# Patient Record
Sex: FEMALE | Race: WHITE | NOT HISPANIC OR LATINO
[De-identification: names, ages, dates, MRNs, and addresses within clinical notes are randomized per-mention and may not be internally consistent; named-entity substitution may affect disease eponyms.]

---

## 2018-03-21 PROBLEM — Z00.00 ENCOUNTER FOR PREVENTIVE HEALTH EXAMINATION: Status: ACTIVE | Noted: 2018-03-21

## 2018-04-20 ENCOUNTER — APPOINTMENT (OUTPATIENT)
Dept: ENDOCRINOLOGY | Facility: CLINIC | Age: 28
End: 2018-04-20
Payer: COMMERCIAL

## 2018-04-20 ENCOUNTER — RX RENEWAL (OUTPATIENT)
Age: 28
End: 2018-04-20

## 2018-04-20 VITALS
OXYGEN SATURATION: 98 % | WEIGHT: 118 LBS | HEIGHT: 59 IN | HEART RATE: 111 BPM | DIASTOLIC BLOOD PRESSURE: 70 MMHG | SYSTOLIC BLOOD PRESSURE: 110 MMHG | BODY MASS INDEX: 23.79 KG/M2

## 2018-04-20 DIAGNOSIS — Z83.3 FAMILY HISTORY OF DIABETES MELLITUS: ICD-10-CM

## 2018-04-20 DIAGNOSIS — Z82.49 FAMILY HISTORY OF ISCHEMIC HEART DISEASE AND OTHER DISEASES OF THE CIRCULATORY SYSTEM: ICD-10-CM

## 2018-04-20 DIAGNOSIS — E10.9 TYPE 1 DIABETES MELLITUS W/OUT COMPLICATIONS: ICD-10-CM

## 2018-04-20 LAB — GLUCOSE BLDC GLUCOMTR-MCNC: 372

## 2018-04-20 PROCEDURE — 36415 COLL VENOUS BLD VENIPUNCTURE: CPT

## 2018-04-20 PROCEDURE — 99204 OFFICE O/P NEW MOD 45 MIN: CPT | Mod: 25

## 2018-04-20 PROCEDURE — 82962 GLUCOSE BLOOD TEST: CPT

## 2018-04-20 RX ORDER — BLOOD-GLUCOSE METER
KIT MISCELLANEOUS
Qty: 1 | Refills: 0 | Status: ACTIVE | COMMUNITY
Start: 2018-04-20 | End: 1900-01-01

## 2018-04-20 RX ORDER — BLOOD SUGAR DIAGNOSTIC
STRIP MISCELLANEOUS
Qty: 2 | Refills: 5 | Status: ACTIVE | COMMUNITY
Start: 2018-04-20 | End: 1900-01-01

## 2018-04-20 RX ORDER — LANCETS 28 GAUGE
EACH MISCELLANEOUS
Qty: 2 | Refills: 5 | Status: ACTIVE | COMMUNITY
Start: 2018-04-20 | End: 1900-01-01

## 2018-04-22 PROBLEM — Z83.3 FAMILY HISTORY OF TYPE 1 DIABETES MELLITUS: Status: ACTIVE | Noted: 2018-04-22

## 2018-04-22 PROBLEM — Z83.3 FAMILY HISTORY OF DIABETES MELLITUS: Status: ACTIVE | Noted: 2018-04-22

## 2018-04-22 PROBLEM — Z82.49 FAMILY HISTORY OF HEART FAILURE: Status: ACTIVE | Noted: 2018-04-22

## 2018-04-22 PROBLEM — Z82.49 FAMILY HISTORY OF CONGESTIVE HEART FAILURE: Status: ACTIVE | Noted: 2018-04-22

## 2018-04-22 RX ORDER — BLOOD-GLUCOSE METER
W/DEVICE EACH MISCELLANEOUS
Qty: 1 | Refills: 0 | Status: DISCONTINUED | COMMUNITY
Start: 2018-04-20 | End: 2018-04-22

## 2018-04-22 RX ORDER — INSULIN GLARGINE 100 [IU]/ML
100 INJECTION, SOLUTION SUBCUTANEOUS
Qty: 1 | Refills: 0 | Status: DISCONTINUED | COMMUNITY
Start: 2018-04-20 | End: 2018-04-22

## 2018-05-03 LAB
25(OH)D3 SERPL-MCNC: 18.4 NG/ML
ALBUMIN SERPL ELPH-MCNC: 4.3 G/DL
ALP BLD-CCNC: 114 U/L
ALT SERPL-CCNC: 23 U/L
ANION GAP SERPL CALC-SCNC: NORMAL
AST SERPL-CCNC: 26 U/L
BASOPHILS # BLD AUTO: 0.03 K/UL
BASOPHILS NFR BLD AUTO: 0.3 %
BILIRUB SERPL-MCNC: <0.2 MG/DL
BUN SERPL-MCNC: 15 MG/DL
C PEPTIDE SERPL-MCNC: <0.1 NG/ML
CALCIUM SERPL-MCNC: 10.5 MG/DL
CHLORIDE SERPL-SCNC: 102 MMOL/L
CHOLEST SERPL-MCNC: 321 MG/DL
CHOLEST/HDLC SERPL: 4.9 RATIO
CO2 SERPL-SCNC: NORMAL
CREAT SERPL-MCNC: 0.78 MG/DL
CREAT SPEC-SCNC: 35 MG/DL
EOSINOPHIL # BLD AUTO: 0.09 K/UL
EOSINOPHIL NFR BLD AUTO: 0.9 %
GAD65 AB SER-MCNC: 0.14 NMOL/L
GLUCOSE SERPL-MCNC: 275 MG/DL
HBA1C MFR BLD HPLC: 9.1 %
HCT VFR BLD CALC: 42.8 %
HDLC SERPL-MCNC: 66 MG/DL
HGB BLD-MCNC: 13.8 G/DL
IGA SER QL IEP: 307 MG/DL
IMM GRANULOCYTES NFR BLD AUTO: 0.3 %
LDLC SERPL CALC-MCNC: 211 MG/DL
LYMPHOCYTES # BLD AUTO: 2.91 K/UL
LYMPHOCYTES NFR BLD AUTO: 30.7 %
MAN DIFF?: NORMAL
MCHC RBC-ENTMCNC: 31.5 PG
MCHC RBC-ENTMCNC: 32.2 GM/DL
MCV RBC AUTO: 97.7 FL
MICROALBUMIN 24H UR DL<=1MG/L-MCNC: <0.3 MG/DL
MICROALBUMIN/CREAT 24H UR-RTO: NORMAL
MONOCYTES # BLD AUTO: 0.45 K/UL
MONOCYTES NFR BLD AUTO: 4.7 %
NEUTROPHILS # BLD AUTO: 5.97 K/UL
NEUTROPHILS NFR BLD AUTO: 63.1 %
PANC ISLET CELL AB SER QL: <5 JDF UNITS
PLATELET # BLD AUTO: 418 K/UL
POTASSIUM SERPL-SCNC: 4.4 MMOL/L
PROT SERPL-MCNC: 7.6 G/DL
RBC # BLD: 4.38 M/UL
RBC # FLD: 14.1 %
SODIUM SERPL-SCNC: 146 MMOL/L
T4 FREE SERPL-MCNC: 1 NG/DL
TRIGL SERPL-MCNC: 220 MG/DL
TSH SERPL-ACNC: 1.08 UIU/ML
TTG IGA SER IA-ACNC: 6.7 UNITS
TTG IGA SER-ACNC: NEGATIVE
TTG IGG SER IA-ACNC: 6.4 UNITS
TTG IGG SER IA-ACNC: NEGATIVE
WBC # FLD AUTO: 9.48 K/UL

## 2018-11-03 ENCOUNTER — TRANSCRIPTION ENCOUNTER (OUTPATIENT)
Age: 28
End: 2018-11-03

## 2019-02-25 ENCOUNTER — RX RENEWAL (OUTPATIENT)
Age: 29
End: 2019-02-25

## 2019-03-27 ENCOUNTER — TRANSCRIPTION ENCOUNTER (OUTPATIENT)
Age: 29
End: 2019-03-27

## 2019-04-01 ENCOUNTER — RX RENEWAL (OUTPATIENT)
Age: 29
End: 2019-04-01

## 2019-04-03 ENCOUNTER — MEDICATION RENEWAL (OUTPATIENT)
Age: 29
End: 2019-04-03

## 2019-04-22 ENCOUNTER — APPOINTMENT (OUTPATIENT)
Dept: ENDOCRINOLOGY | Facility: CLINIC | Age: 29
End: 2019-04-22
Payer: COMMERCIAL

## 2019-04-22 VITALS
OXYGEN SATURATION: 98 % | HEIGHT: 59 IN | BODY MASS INDEX: 28.02 KG/M2 | HEART RATE: 95 BPM | SYSTOLIC BLOOD PRESSURE: 110 MMHG | DIASTOLIC BLOOD PRESSURE: 70 MMHG | WEIGHT: 139 LBS

## 2019-04-22 DIAGNOSIS — Z87.891 PERSONAL HISTORY OF NICOTINE DEPENDENCE: ICD-10-CM

## 2019-04-22 LAB
GLUCOSE BLDC GLUCOMTR-MCNC: 85
HBA1C MFR BLD HPLC: 8.7

## 2019-04-22 PROCEDURE — 99214 OFFICE O/P EST MOD 30 MIN: CPT | Mod: 25

## 2019-04-22 PROCEDURE — 83036 HEMOGLOBIN GLYCOSYLATED A1C: CPT | Mod: QW

## 2019-04-22 PROCEDURE — 82962 GLUCOSE BLOOD TEST: CPT

## 2019-04-22 RX ORDER — BLOOD-GLUCOSE METER
W/DEVICE EACH MISCELLANEOUS
Qty: 1 | Refills: 0 | Status: ACTIVE | COMMUNITY
Start: 2019-04-22 | End: 1900-01-01

## 2019-04-22 RX ORDER — LANCETS
EACH MISCELLANEOUS
Qty: 6 | Refills: 0 | Status: ACTIVE | COMMUNITY
Start: 2019-04-22 | End: 1900-01-01

## 2019-04-22 RX ORDER — FLASH GLUCOSE SENSOR
KIT MISCELLANEOUS
Qty: 1 | Refills: 0 | Status: ACTIVE | COMMUNITY
Start: 2019-04-22 | End: 1900-01-01

## 2019-04-22 RX ORDER — BLOOD SUGAR DIAGNOSTIC
STRIP MISCELLANEOUS 4 TIMES DAILY
Qty: 6 | Refills: 0 | Status: ACTIVE | COMMUNITY
Start: 2019-04-22 | End: 1900-01-01

## 2019-04-29 PROBLEM — Z87.891 FORMER SMOKER: Status: ACTIVE | Noted: 2019-04-29

## 2019-04-29 RX ORDER — PHENYLEPHRINE HCL 10 MG
7 TABLET ORAL
Qty: 14 | Refills: 0 | Status: DISCONTINUED | COMMUNITY
Start: 2018-04-20 | End: 2019-04-29

## 2019-04-29 RX ORDER — NICOTINE 21 MG/24HR
14 PATCH, TRANSDERMAL 24 HOURS TRANSDERMAL
Qty: 42 | Refills: 0 | Status: DISCONTINUED | COMMUNITY
Start: 2018-04-20 | End: 2019-04-29

## 2019-04-29 NOTE — ASSESSMENT
[FreeTextEntry1] : 29 yo F with PMH DM1, \par \par 1. DM1 - HbA1c 9.0  Advised to send in glucose log for further adjustment. Continue current regimen as above for now and adjust as indicated.\par \par .

## 2019-04-29 NOTE — HISTORY OF PRESENT ILLNESS
[FreeTextEntry1] : 27 yo F with PMH DM1,\par \par Quit smoking 9/2018\par Diagnosed with DM1 when she was 22 years old\par She is on levemir 18 u QHS and humalog 4-10  tidac \par for every 15g carbs she will take 2u humalog. \par Microvascular complications: denies neuropathy\par last ophthalmologist visit 3/2018 - denies  retinopathy\par Has never seen a podiatrist\par Last saw a nutritionist the year of diagnosis\par B: oatmeal 18 g takes 4u\par L: cken salad + croutons + piece of bread 30-40g takes 4-6 u\par D: cken noodle soup //turkey chili - no rice 35-45 g  6-10 MC 6-8 u\par noted to correctly count carbs\par F: \par L: \par D: \par snacks on berries and nuts\par

## 2019-06-14 ENCOUNTER — OTHER (OUTPATIENT)
Age: 29
End: 2019-06-14

## 2019-06-17 ENCOUNTER — APPOINTMENT (OUTPATIENT)
Dept: ENDOCRINOLOGY | Facility: CLINIC | Age: 29
End: 2019-06-17
Payer: COMMERCIAL

## 2019-09-25 ENCOUNTER — RX RENEWAL (OUTPATIENT)
Age: 29
End: 2019-09-25

## 2019-09-25 RX ORDER — PEN NEEDLE, DIABETIC 29 G X1/2"
32G X 4 MM NEEDLE, DISPOSABLE MISCELLANEOUS
Qty: 2 | Refills: 4 | Status: ACTIVE | COMMUNITY
Start: 2018-04-20 | End: 1900-01-01

## 2019-10-02 ENCOUNTER — RESULT REVIEW (OUTPATIENT)
Age: 29
End: 2019-10-02

## 2019-10-15 ENCOUNTER — APPOINTMENT (OUTPATIENT)
Dept: ENDOCRINOLOGY | Facility: CLINIC | Age: 29
End: 2019-10-15
Payer: COMMERCIAL

## 2019-10-15 VITALS
WEIGHT: 144 LBS | DIASTOLIC BLOOD PRESSURE: 76 MMHG | HEART RATE: 100 BPM | SYSTOLIC BLOOD PRESSURE: 110 MMHG | OXYGEN SATURATION: 94 % | BODY MASS INDEX: 29.03 KG/M2 | TEMPERATURE: 98.7 F | HEIGHT: 59 IN

## 2019-10-15 DIAGNOSIS — E78.5 HYPERLIPIDEMIA, UNSPECIFIED: ICD-10-CM

## 2019-10-15 PROCEDURE — 36415 COLL VENOUS BLD VENIPUNCTURE: CPT

## 2019-10-15 PROCEDURE — 82962 GLUCOSE BLOOD TEST: CPT

## 2019-10-15 PROCEDURE — 99214 OFFICE O/P EST MOD 30 MIN: CPT | Mod: 25

## 2019-10-15 NOTE — HISTORY OF PRESENT ILLNESS
[FreeTextEntry1] : 29 yo F with PMH DM1\par \par Quit smoking 9/2018\par Diagnosed with DM1 when she was 22 years old\par She is on levemir 18 u QHS and humalog for every 15g carbs she will take 2u humalog. \par Microvascular complications: denies neuropathy\par last ophthalmologist visit 2/2019 - denies  retinopathy\par Has never seen a podiatrist\par Last saw a nutritionist the year of diagnosis\par B: oatmeal and coffee 18-30g\par L: cken and bread //grilled cken wrap + water 30-40g\par D: meatballs and salad (vinegar) 30-50g\par snacks on nuts , popcorn, fruit, apples, berries\par noted to correctly count carbs\par F: \par D: \par walks 45 minutes 2-3 x per week\par hypoglycemic twice in the last month\par reports hair loss since 6/2019\par has always had regular menstruation \par denies acne and  hirsutism

## 2019-10-15 NOTE — ASSESSMENT
[FreeTextEntry1] : 27 yo F with PMH DM1, \par \par 1. DM1 - HbA1c 9.0  Advised to send in glucose log for further adjustment. Continue current regimen as above for now and adjust as indicated.\par \par 2. Hair loss - recent passing of her father. possibly telogen effluvium\par \par .

## 2019-10-23 DIAGNOSIS — E06.3 AUTOIMMUNE THYROIDITIS: ICD-10-CM

## 2019-10-23 LAB
25(OH)D3 SERPL-MCNC: 15 NG/ML
ALBUMIN SERPL ELPH-MCNC: 4.4 G/DL
ALP BLD-CCNC: 82 U/L
ALT SERPL-CCNC: 18 U/L
ANION GAP SERPL CALC-SCNC: 14 MMOL/L
AST SERPL-CCNC: 17 U/L
BASOPHILS # BLD AUTO: 0.05 K/UL
BASOPHILS NFR BLD AUTO: 0.4 %
BILIRUB SERPL-MCNC: 0.2 MG/DL
BUN SERPL-MCNC: 12 MG/DL
CALCIUM SERPL-MCNC: 9.7 MG/DL
CHLORIDE SERPL-SCNC: 103 MMOL/L
CHOLEST SERPL-MCNC: 229 MG/DL
CHOLEST/HDLC SERPL: 5.2 RATIO
CK SERPL-CCNC: 52 U/L
CO2 SERPL-SCNC: 18 MMOL/L
CREAT SERPL-MCNC: 0.48 MG/DL
CREAT SPEC-SCNC: 65 MG/DL
DHEA-S SERPL-MCNC: 801 UG/DL
EOSINOPHIL # BLD AUTO: 0.26 K/UL
EOSINOPHIL NFR BLD AUTO: 2.1 %
ESTIMATED AVERAGE GLUCOSE: 200 MG/DL
ESTRADIOL SERPL-MCNC: 196 PG/ML
FERRITIN SERPL-MCNC: 101 NG/ML
FOLATE SERPL-MCNC: 16.4 NG/ML
FSH SERPL-MCNC: 1.1 IU/L
GLUCOSE BLDC GLUCOMTR-MCNC: 114
GLUCOSE SERPL-MCNC: 123 MG/DL
HBA1C MFR BLD HPLC: 8.6 %
HCT VFR BLD CALC: 44.1 %
HDLC SERPL-MCNC: 44 MG/DL
HGB BLD-MCNC: 14.5 G/DL
IMM GRANULOCYTES NFR BLD AUTO: 0.2 %
IRON SATN MFR SERPL: 17 %
IRON SERPL-MCNC: 51 UG/DL
LDLC SERPL CALC-MCNC: 139 MG/DL
LH SERPL-ACNC: 2.3 IU/L
LYMPHOCYTES # BLD AUTO: 3.66 K/UL
LYMPHOCYTES NFR BLD AUTO: 29.9 %
MAN DIFF?: NORMAL
MCHC RBC-ENTMCNC: 29.9 PG
MCHC RBC-ENTMCNC: 32.9 GM/DL
MCV RBC AUTO: 90.9 FL
MICROALBUMIN 24H UR DL<=1MG/L-MCNC: <1.2 MG/DL
MICROALBUMIN/CREAT 24H UR-RTO: NORMAL MG/G
MONOCYTES # BLD AUTO: 0.65 K/UL
MONOCYTES NFR BLD AUTO: 5.3 %
NEUTROPHILS # BLD AUTO: 7.6 K/UL
NEUTROPHILS NFR BLD AUTO: 62.1 %
PLATELET # BLD AUTO: 435 K/UL
POTASSIUM SERPL-SCNC: 4.3 MMOL/L
PROT SERPL-MCNC: 7.4 G/DL
RBC # BLD: 4.85 M/UL
RBC # FLD: 12.4 %
SODIUM SERPL-SCNC: 135 MMOL/L
T4 FREE SERPL-MCNC: 1 NG/DL
TESTOST BND SERPL-MCNC: 0.51 NG/DL
TESTOST SERPL-MCNC: 49.2 NG/DL
TESTOSTERONE BIOAVAILABLE: 6.8 NG/DL
TESTOSTERONE TOTAL S: 27 NG/DL
THYROGLOB AB SERPL-ACNC: <20 IU/ML
THYROPEROXIDASE AB SERPL IA-ACNC: 325 IU/ML
TIBC SERPL-MCNC: 298 UG/DL
TRIGL SERPL-MCNC: 232 MG/DL
TSH SERPL-ACNC: 1.42 UIU/ML
UIBC SERPL-MCNC: 247 UG/DL
VIT B12 SERPL-MCNC: 530 PG/ML
WBC # FLD AUTO: 12.25 K/UL

## 2019-11-11 LAB — SHBG SERPL-SCNC: NORMAL

## 2019-11-13 ENCOUNTER — RX RENEWAL (OUTPATIENT)
Age: 29
End: 2019-11-13

## 2019-11-18 ENCOUNTER — RX RENEWAL (OUTPATIENT)
Age: 29
End: 2019-11-18

## 2019-11-26 ENCOUNTER — APPOINTMENT (OUTPATIENT)
Dept: ENDOCRINOLOGY | Facility: CLINIC | Age: 29
End: 2019-11-26
Payer: COMMERCIAL

## 2019-11-26 VITALS
BODY MASS INDEX: 29.03 KG/M2 | SYSTOLIC BLOOD PRESSURE: 125 MMHG | WEIGHT: 144 LBS | HEART RATE: 81 BPM | DIASTOLIC BLOOD PRESSURE: 82 MMHG | OXYGEN SATURATION: 95 % | HEIGHT: 59 IN

## 2019-11-26 LAB — GLUCOSE BLDC GLUCOMTR-MCNC: 266

## 2019-11-26 PROCEDURE — 99214 OFFICE O/P EST MOD 30 MIN: CPT | Mod: 25

## 2019-11-26 PROCEDURE — 82962 GLUCOSE BLOOD TEST: CPT

## 2019-11-27 NOTE — ASSESSMENT
[FreeTextEntry1] : 29 yo F with PMH DM1, elevated DHEAS, low FSH, Hashimoto's thyroiditis,PAS2\par \par 1. DM1 - will  increase levemir to 21 units at bedtime and humalog I:C 1:7 also low HSS premeal\par \par 2. elevated DHEAS - refer for pelvic US, and adrenal CT. admits to hair loss\par \par 3. Low FSH - repeat gonadal axis\par \par 4. Hashimoto's thyroiditis - currently euthyroid. refer for thyroid US \par \par \par \par .

## 2019-11-27 NOTE — HISTORY OF PRESENT ILLNESS
[FreeTextEntry1] : 27 yo F with PMH DM1, elevated DHEAS, low FSH, Hashimotos thyroiditis\par \par Quit smoking 9/2018\par Diagnosed with DM1 when she was 22 years old\par She is on levemir 18 u QHS and humalog for every 15g carbs she will take 2u humalog. \par Microvascular complications: denies neuropathy\par last ophthalmologist visit 3/2019 - denies  retinopathy\par Has never seen a podiatrist\par  saw a nutritionist the year of diagnosis\par refer to scanned food log\par no hypoglycemia noted on cleopatra\par reports hair loss since 6/2019\par found to have significantly elevated DHEAS to 801\par has always had regular menstruation \par denies acne and  hirsutism\par found to have hashimotos thyroiditis after last visit

## 2019-12-05 LAB
17OHP SERPL-MCNC: 15 NG/DL
25(OH)D3 SERPL-MCNC: 17.6 NG/ML
ALBUMIN SERPL ELPH-MCNC: 4.3 G/DL
ALP BLD-CCNC: 86 U/L
ALT SERPL-CCNC: 21 U/L
ANDROST SERPL-MCNC: 89 NG/DL
ANION GAP SERPL CALC-SCNC: 16 MMOL/L
AST SERPL-CCNC: 17 U/L
BILIRUB SERPL-MCNC: 0.3 MG/DL
BUN SERPL-MCNC: 13 MG/DL
CALCIUM SERPL-MCNC: 9.7 MG/DL
CHLORIDE SERPL-SCNC: 98 MMOL/L
CHOLEST SERPL-MCNC: 262 MG/DL
CHOLEST/HDLC SERPL: 5 RATIO
CK SERPL-CCNC: 60 U/L
CO2 SERPL-SCNC: 20 MMOL/L
CREAT SERPL-MCNC: 0.53 MG/DL
DHEA-S SERPL-MCNC: 561 UG/DL
ESTIMATED AVERAGE GLUCOSE: 212 MG/DL
ESTRADIOL SERPL-MCNC: 26 PG/ML
FSH SERPL-MCNC: 6.8 IU/L
GLUCOSE SERPL-MCNC: 308 MG/DL
HBA1C MFR BLD HPLC: 9 %
HDLC SERPL-MCNC: 52 MG/DL
LDLC SERPL CALC-MCNC: 173 MG/DL
LH SERPL-ACNC: 5.4 IU/L
POTASSIUM SERPL-SCNC: 4.5 MMOL/L
PROT SERPL-MCNC: 7.1 G/DL
SHBG SERPL-SCNC: 28 NMOL/L
SODIUM SERPL-SCNC: 134 MMOL/L
TESTOST BND SERPL-MCNC: 0.48 NG/DL
TESTOST SERPL-MCNC: 38.4 NG/DL
TESTOSTERONE BIOAVAILABLE: 6.5 NG/DL
TESTOSTERONE TOTAL S: 25 NG/DL
TRIGL SERPL-MCNC: 186 MG/DL

## 2019-12-05 RX ORDER — ERGOCALCIFEROL 1.25 MG/1
1.25 MG CAPSULE, LIQUID FILLED ORAL
Qty: 8 | Refills: 0 | Status: ACTIVE | COMMUNITY
Start: 2019-12-05 | End: 1900-01-01

## 2019-12-08 ENCOUNTER — FORM ENCOUNTER (OUTPATIENT)
Age: 29
End: 2019-12-08

## 2019-12-09 ENCOUNTER — APPOINTMENT (OUTPATIENT)
Dept: ULTRASOUND IMAGING | Facility: CLINIC | Age: 29
End: 2019-12-09
Payer: COMMERCIAL

## 2019-12-09 ENCOUNTER — APPOINTMENT (OUTPATIENT)
Dept: CT IMAGING | Facility: CLINIC | Age: 29
End: 2019-12-09
Payer: COMMERCIAL

## 2019-12-09 ENCOUNTER — OUTPATIENT (OUTPATIENT)
Dept: OUTPATIENT SERVICES | Facility: HOSPITAL | Age: 29
LOS: 1 days | End: 2019-12-09
Payer: COMMERCIAL

## 2019-12-09 DIAGNOSIS — Z00.8 ENCOUNTER FOR OTHER GENERAL EXAMINATION: ICD-10-CM

## 2019-12-09 PROCEDURE — 74150 CT ABDOMEN W/O CONTRAST: CPT | Mod: 26

## 2019-12-09 PROCEDURE — 76536 US EXAM OF HEAD AND NECK: CPT | Mod: 26

## 2019-12-09 PROCEDURE — 76856 US EXAM PELVIC COMPLETE: CPT | Mod: 26

## 2019-12-09 PROCEDURE — 76856 US EXAM PELVIC COMPLETE: CPT

## 2019-12-09 PROCEDURE — 76536 US EXAM OF HEAD AND NECK: CPT

## 2019-12-09 PROCEDURE — 74150 CT ABDOMEN W/O CONTRAST: CPT

## 2019-12-16 ENCOUNTER — RESULT REVIEW (OUTPATIENT)
Age: 29
End: 2019-12-16

## 2019-12-16 RX ORDER — INSULIN DETEMIR 100 [IU]/ML
100 INJECTION, SOLUTION SUBCUTANEOUS
Qty: 1 | Refills: 4 | Status: ACTIVE | COMMUNITY
Start: 2018-04-20 | End: 1900-01-01

## 2019-12-18 ENCOUNTER — RESULT REVIEW (OUTPATIENT)
Age: 29
End: 2019-12-18

## 2019-12-31 ENCOUNTER — APPOINTMENT (OUTPATIENT)
Dept: ENDOCRINOLOGY | Facility: CLINIC | Age: 29
End: 2019-12-31
Payer: COMMERCIAL

## 2019-12-31 VITALS
SYSTOLIC BLOOD PRESSURE: 124 MMHG | BODY MASS INDEX: 29.23 KG/M2 | HEART RATE: 101 BPM | DIASTOLIC BLOOD PRESSURE: 70 MMHG | HEIGHT: 59 IN | OXYGEN SATURATION: 96 % | WEIGHT: 145 LBS

## 2019-12-31 DIAGNOSIS — E27.8 OTHER SPECIFIED DISORDERS OF ADRENAL GLAND: ICD-10-CM

## 2019-12-31 DIAGNOSIS — S22.39XA FRACTURE OF ONE RIB, UNSPECIFIED SIDE, INITIAL ENCOUNTER FOR CLOSED FRACTURE: ICD-10-CM

## 2019-12-31 LAB
DHEA-S SERPL-MCNC: 544 UG/DL
ESTRADIOL SERPL-MCNC: 18 PG/ML
FSH SERPL-MCNC: 5.2 IU/L
GLUCOSE BLDC GLUCOMTR-MCNC: 295
LH SERPL-ACNC: 4.7 IU/L
SHBG SERPL-SCNC: 22 NMOL/L
TESTOST BND SERPL-MCNC: 0.5 NG/DL
TESTOST SERPL-MCNC: 42.8 NG/DL
TESTOSTERONE BIOAVAILABLE: 5.9 NG/DL
TESTOSTERONE TOTAL S: 21 NG/DL

## 2019-12-31 PROCEDURE — 99214 OFFICE O/P EST MOD 30 MIN: CPT | Mod: 25

## 2019-12-31 PROCEDURE — 36415 COLL VENOUS BLD VENIPUNCTURE: CPT

## 2019-12-31 PROCEDURE — 82962 GLUCOSE BLOOD TEST: CPT

## 2020-01-03 PROBLEM — E27.8 ELEVATED DEHYDROEPIANDROSTERONE SULFATE LEVEL: Status: ACTIVE | Noted: 2020-01-03

## 2020-01-03 PROBLEM — S22.39XA RIB FRACTURE: Status: ACTIVE | Noted: 2020-01-03

## 2020-01-03 PROBLEM — E27.8 ELEVATED DEHYDROEPIANDROSTERONE SULFATE LEVEL: Status: RESOLVED | Noted: 2019-10-23 | Resolved: 2020-01-03

## 2020-01-03 NOTE — HISTORY OF PRESENT ILLNESS
[FreeTextEntry1] : 28 yo F with PMH DM1, elevated DHEAS, Hashimotos thyroiditis\par \par Quit smoking 9/2018\par Diagnosed with DM1 when she was 22 years old\par on levemir to 21 units at bedtime and humalog I:C 1:7 also low HSS premeal\par Microvascular complications: denies neuropathy\par Started on atorvastatin 10 mg qd. after last visit\par last ophthalmologist visit 3/2019 - denies  retinopathy\par Has never seen a podiatrist\par saw a nutritionist the year of diagnosis\par admits to overnight  hypoglycemia can not offer explanation \par on cleopatra\par B: oatmeal \par snack apple \par L: wrap\par greek yogurt\par D: egg + potato + berries + water\par snack: yogurt, berries, cheese\par refer to scanned glucose log\par reports hair loss since 6/2019\par found to have significantly elevated DHEAS to 801\par has always had regular menstruation \par denies acne and  hirsutism\par elevated DHEAS to 561- pending adrenal CT and pelvic US. gonadal axis normal\par 12/9/19 CT abdomen non acute 10th rib fx. - no  adrenal nodule\par 12/9 thyroid US did not reveal thyroid nodules. \par 12/9 normal pelvic sono.

## 2020-01-03 NOTE — ASSESSMENT
[FreeTextEntry1] : 28 yo F with PMH DM1, elevated DHEAS, Hashimoto's thyroiditis\par \par 1. DM1 - will increase levemir to 23 u qhs and continue IC ratio and low HSS\par \par 2. HLD - continue recently initiated lipitor\par \par 3. Elevated DHEAS/hyperandrogenism - continue to monitor for now. No nodule on adrenal CT

## 2020-01-05 ENCOUNTER — OTHER (OUTPATIENT)
Age: 30
End: 2020-01-05

## 2020-01-05 LAB
17OHP SERPL-MCNC: 12 NG/DL
ALBUMIN SERPL ELPH-MCNC: 3.9 G/DL
ALP BLD-CCNC: 70 U/L
ALT SERPL-CCNC: 24 U/L
ANION GAP SERPL CALC-SCNC: 20 MMOL/L
AST SERPL-CCNC: 52 U/L
BILIRUB SERPL-MCNC: 0.3 MG/DL
BUN SERPL-MCNC: 9 MG/DL
CALCIUM SERPL-MCNC: 9.1 MG/DL
CHLORIDE SERPL-SCNC: 103 MMOL/L
CHOLEST SERPL-MCNC: 157 MG/DL
CHOLEST/HDLC SERPL: 4.8 RATIO
CK SERPL-CCNC: 1467 U/L
CO2 SERPL-SCNC: 15 MMOL/L
CREAT SERPL-MCNC: 0.43 MG/DL
GLUCOSE SERPL-MCNC: 298 MG/DL
HDLC SERPL-MCNC: 33 MG/DL
LDLC SERPL CALC-MCNC: 104 MG/DL
POTASSIUM SERPL-SCNC: 5.2 MMOL/L
PROT SERPL-MCNC: 7 G/DL
SODIUM SERPL-SCNC: 138 MMOL/L
TRIGL SERPL-MCNC: 98 MG/DL

## 2020-01-27 DIAGNOSIS — E55.9 VITAMIN D DEFICIENCY, UNSPECIFIED: ICD-10-CM

## 2020-01-27 RX ORDER — ADHESIVE TAPE 3"X 2.3 YD
50 MCG TAPE, NON-MEDICATED TOPICAL
Qty: 90 | Refills: 0 | Status: ACTIVE | COMMUNITY
Start: 2020-01-27 | End: 1900-01-01

## 2020-05-18 RX ORDER — INSULIN GLARGINE 300 U/ML
300 INJECTION, SOLUTION SUBCUTANEOUS
Qty: 1 | Refills: 3 | Status: ACTIVE | COMMUNITY
Start: 2020-05-18 | End: 1900-01-01

## 2021-01-19 ENCOUNTER — TRANSCRIPTION ENCOUNTER (OUTPATIENT)
Age: 31
End: 2021-01-19

## 2021-01-19 RX ORDER — FLASH GLUCOSE SENSOR
KIT MISCELLANEOUS
Qty: 6 | Refills: 0 | Status: ACTIVE | COMMUNITY
Start: 2019-04-22 | End: 1900-01-01

## 2021-04-28 RX ORDER — INSULIN LISPRO 100 [IU]/ML
100 INJECTION, SOLUTION INTRAVENOUS; SUBCUTANEOUS
Qty: 3 | Refills: 3 | Status: ACTIVE | COMMUNITY
Start: 2018-04-20 | End: 1900-01-01

## 2021-05-24 RX ORDER — ATORVASTATIN CALCIUM 20 MG/1
20 TABLET, FILM COATED ORAL
Qty: 90 | Refills: 3 | Status: ACTIVE | COMMUNITY
Start: 2019-12-05 | End: 1900-01-01

## 2021-07-21 ENCOUNTER — RX RENEWAL (OUTPATIENT)
Age: 31
End: 2021-07-21

## 2021-07-21 RX ORDER — INSULIN GLARGINE 100 [IU]/ML
100 INJECTION, SOLUTION SUBCUTANEOUS
Qty: 3 | Refills: 5 | Status: ACTIVE | COMMUNITY
Start: 2021-01-21 | End: 1900-01-01

## 2022-07-13 ENCOUNTER — TRANSCRIPTION ENCOUNTER (OUTPATIENT)
Age: 32
End: 2022-07-13

## 2022-08-10 ENCOUNTER — RESULT REVIEW (OUTPATIENT)
Age: 32
End: 2022-08-10

## 2025-04-06 ENCOUNTER — NON-APPOINTMENT (OUTPATIENT)
Age: 35
End: 2025-04-06